# Patient Record
(demographics unavailable — no encounter records)

---

## 2024-10-11 NOTE — HISTORY OF PRESENT ILLNESS
[FreeTextEntry1] : Elisha Hare presents for consultation regarding LEEP proceudure. She was referred by .

## 2024-12-03 NOTE — HISTORY OF PRESENT ILLNESS
[FreeTextEntry1] : 42 y/o pt presents in office today for a follow up visit after her Leep Procedure. She states she did bleed for a couple of weeks after the procedure however it was pretty light. She states that she has some pink mucus/discharge.  [LMP unknown] : LMP unknown [Currently Active] : currently active [Men] : men [No] : No [Patient refuses STI testing] : Patient refuses STI testing